# Patient Record
Sex: FEMALE | Race: WHITE | NOT HISPANIC OR LATINO | URBAN - METROPOLITAN AREA
[De-identification: names, ages, dates, MRNs, and addresses within clinical notes are randomized per-mention and may not be internally consistent; named-entity substitution may affect disease eponyms.]

---

## 2017-08-10 ENCOUNTER — GENERIC CONVERSION - ENCOUNTER (OUTPATIENT)
Dept: OTHER | Facility: OTHER | Age: 22
End: 2017-08-10

## 2018-01-16 NOTE — MISCELLANEOUS
Message   Date: 10 Aug 2017 8:37 AM EST, Recorded By: Mirela Regalado For: Meghan Alford   Caller: [place], Self   Phone: [phone] (Home)   Patient's mother called to refill her OC's  We have not seen patient nor have we given her a prescription since 2015  I told her we could not fill it since we haven't authorized it in two years and the patient would need an appointment  The patient is in college away from home and cannot come in for an appointment  The mom continued to tell me about how ridiculous it is that we can't give it to her since it is a low dose and she is only on it for irregular cycles  I told her I understood how difficult it is when your daughter is at school but, unfortunately, patients on birth control pills do need once yearly exams along with evaluation of their blood pressure and other vitals  The mom was quite upset  I wished her good luck and told her we would see Sandor Jasmine if she wanted to schedule an appointment for next time she is in town  Active Problems    1  Allergic rhinitis (477 9) (J30 9)   2  Allergy to shellfish (V15 04) (Z91 013)   3  Amenorrhea (626 0) (N91 2)   4  Eczema (692 9) (L30 9)   5  GERD (gastroesophageal reflux disease) (530 81) (K21 9)   6  Obesity (278 00) (E66 9)   7  Tachycardia with 100 - 120 beats per minute (785 0) (R00 0)    Current Meds   1  EpiPen 2-Ricardo 0 3 MG/0 3ML Injection Solution Auto-injector; INJECT 0 3ML   INTRAMUSCULARLY AS DIRECTED; Therapy: [id number] to (Last Rx:29Jan2015)  Requested for: 18DNS2209 Ordered   2  Minastrin 24 Fe 1-20 MG-MCG(24) Oral Tablet Chewable (Norethin Ace-Eth Estrad-FE); One po daily; Therapy: 46ZZZ7571 to ([facility])  Requested for: 16SHF9798; Last   Rx:[id number] Ordered    Allergies    1  No Known Drug Allergies    2  Animal dander   3  Animal dander - Cats   4  Grass   5  Mold   6  Peanuts   7  Pollen   8  Shellfish   9  Soy   10   Trees    Signatures   Electronically signed by : [facility] Suarez, ; Aug 10 2017  8:43AM EST                       (Author)

## 2018-09-25 ENCOUNTER — TELEPHONE (OUTPATIENT)
Dept: FAMILY MEDICINE CLINIC | Facility: CLINIC | Age: 23
End: 2018-09-25

## 2018-09-25 NOTE — TELEPHONE ENCOUNTER
Called pt and discussed   Wants to get pregnant but told by GYN to get thyroid stable before  Now on 25 mcg 0 02     Will stop med and get labs:  TSH, FT3, REFLEX FT4, AUTOANTIBODIES  In 2 weeks  She will  slip   If we can't get stable will send to endocrine